# Patient Record
Sex: MALE | Race: BLACK OR AFRICAN AMERICAN | NOT HISPANIC OR LATINO | Employment: OTHER | URBAN - METROPOLITAN AREA
[De-identification: names, ages, dates, MRNs, and addresses within clinical notes are randomized per-mention and may not be internally consistent; named-entity substitution may affect disease eponyms.]

---

## 2017-09-14 DIAGNOSIS — M25.569 PAIN IN JOINT, LOWER LEG: ICD-10-CM

## 2017-09-14 DIAGNOSIS — M54.9 DORSALGIA: Primary | ICD-10-CM

## 2017-10-13 ENCOUNTER — CLINICAL SUPPORT (OUTPATIENT)
Dept: REHABILITATION | Facility: HOSPITAL | Age: 32
End: 2017-10-13
Payer: MEDICAID

## 2017-10-13 DIAGNOSIS — M54.32 BILATERAL SCIATICA: ICD-10-CM

## 2017-10-13 DIAGNOSIS — M54.31 BILATERAL SCIATICA: ICD-10-CM

## 2017-10-13 DIAGNOSIS — R29.3 POSTURE IMBALANCE: Primary | ICD-10-CM

## 2017-10-13 DIAGNOSIS — R53.1 WEAKNESS: ICD-10-CM

## 2017-10-13 PROCEDURE — G8978 MOBILITY CURRENT STATUS: HCPCS | Mod: CJ

## 2017-10-13 PROCEDURE — 97110 THERAPEUTIC EXERCISES: CPT

## 2017-10-13 PROCEDURE — G8979 MOBILITY GOAL STATUS: HCPCS | Mod: CJ

## 2017-10-13 PROCEDURE — 97161 PT EVAL LOW COMPLEX 20 MIN: CPT
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
## 2017-11-06 ENCOUNTER — CLINICAL SUPPORT (OUTPATIENT)
Dept: REHABILITATION | Facility: HOSPITAL | Age: 32
End: 2017-11-06
Payer: MEDICAID

## 2017-11-06 DIAGNOSIS — R53.1 WEAKNESS: ICD-10-CM

## 2017-11-06 DIAGNOSIS — R29.3 POSTURE IMBALANCE: ICD-10-CM

## 2017-11-06 DIAGNOSIS — M54.32 BILATERAL SCIATICA: ICD-10-CM

## 2017-11-06 DIAGNOSIS — M54.31 BILATERAL SCIATICA: ICD-10-CM

## 2017-11-06 PROCEDURE — 97140 MANUAL THERAPY 1/> REGIONS: CPT

## 2017-11-06 PROCEDURE — 97110 THERAPEUTIC EXERCISES: CPT

## 2017-11-06 NOTE — PROGRESS NOTES
"                                                    Physical Therapy Progress Note     Name: Jacques Arizmendimbdelia  Clinic Number: [id number]  Diagnosis:   Encounter Diagnoses   Name Primary?    Weakness     Posture imbalance     Bilateral sciatica      Physician: Darlin Navarro MD  Treatment Orders: Therapeutic exercise  No past medical history on file.    Precautions: standard    Evaluation Date: 10/13/17  Visit # authorized: 2/20  Authorization period: 12/31/17  Plan of care expiration: 12/12/17  Referring Provider: Darlin Navarro MD    Subjective     Pt reports: he has been doing stretching regimen, insanity workout, intermittent N&T 1 to 2 times per week (sometimes with prolonged sitting)    Pain Scale: before treatment: 4 currently; after treatment: n/a    Objective     ROM: before treatment: ; after treatment: NT    Patient received individual therapy to increase strength, endurance, ROM, flexibility, posture and core stabilization with 1 patients with activities as follows:     Therapeutic exercise: Jacques received therapeutic exercises to develop strength, endurance, ROM, flexibility, posture and core stabilization for 15 minutes including:     Prone:  · prayer stretch: 3x30 sec.  Supine:   · DKTC: 3x30 sec.  · bilateral piriformis stretch: 3x30 sec.  · bilateral sciatic nerve glide: 3x10   · MET right ilial anterior/left posterior ilial rotation: 3x5"  Sidelying:     Sitting:  Standing:    Manual therapy: Jacques received the following manual therapy techniques: Joint mobilizations and Soft tissue Mobilization were applied to: right hip/lumbar spine for 25 minutes.    Manual techniques include:  Soft tissue mobilization:  · Vacuum/cupping: STM was performed to cervical through lumbar paraspinals/QL to decrease muscle tightness, increase circulation and promote healing process for 15 min. The pt's skin was monitored for redness adjusting pressure as needed. The pt was instructed in possible side effects of " bruising and/or soreness.   ·   Joint mobilization:  · p/a Tspine  · right hip short axis lateral distraction    Written Home Exercises Provided: updated HEP: [id number]: DKTC, sciatic nerve glide, and prayer stretch  Pt demo fair understanding of the education provided. Jacques demonstrated fair return demonstration of activities.     Pt. education:  · Posture reeducation, body mechanics, HEP,activity modification/avoidance  · No spiritual or educational barriers to learning provided  · Pt has no cultural, educational or language barriers to learning provided.    Assessment     Pt. reported feeling no pian after treatment session with good end range motion. Pt. reports only performing stretching regimen 2 to 3 times per week. Pt. advised to perform stretching daily along with pelvis correction. Pt. verbalized understanding. Pt. thought he would be receiving blood letting; however, that is not a service we provide. As a result, pt. may not comply with additional attendance. Pt. informed he may undergo dry needling with another therapist who is certified in such treatment. Pt will continue to benefit from skilled outpatient physical therapy to address the remaining functional deficits, provide pt/family education, and to maximize pt's level of independence in the home and community environment.    Anticipated barriers to physical therapy: chronicity of condition    · Pt's spiritual, cultural and educational needs considered and pt agreeable to plan of care and goals as stated below:   Medical necessity is demonstrated by the following IMPAIRMENTS/PROBLEM LIST:              1) Pain limiting function              2) Posture dysfunction              3) Core/Lumbar/LE weakness              4) Decreased thoracic/lumbar joint mobility              5) Decreased Lumbar ROM              6) Decreased soft tissue extensibility/fascia restriction              7) Decreased LE flexibility: bilateral hamstrings and piriformis               8) Lack of HEP              9) LE paresthesia bilateral sciatic nerves     GOALS:   Short Term Goals:  4 weeks  1. Report decreased lumbar spine pain </= 5/10 at worst to increase tolerance for prolonged sitting/standing  2. Pt. to demonstrate proper cervical and scapula retraction requiring min. to no verbal cues from PT  3. Pt. to be independent with pelvis self correction and symptom management  4. Pt to tolerate HEP to improve ROM and independence with ADL's  5. Pt. to report a decrease in bilateral LE paresthesia by >/= 50% to improve ability to perform prolonged sitting/standing     Long Term Goals: 8 weeks  1. Report decreased lumbar spine pain </=  2/10 at worst to increase tolerance for prolonged sitting/standing  2. Pt. to demonstrate proper cervical and scapula retraction requiring no verbal cues from PT  3. Increase thoracic joint mobility to 3-/6 to promote greater ease with self care skills  4. Increase lumbar joint mobility to 3-/6 to promote greater ease with prolonged sitting/standing  5. Pt. to demonstrate increased MMT for core/lumbar paraspinals to 3+/5 to increase endurance with prolonged sitting.   6. Pt. to demonstrate increased MMT for bilateral gluteus medius/gluteus wendy to 5-/5  to increase stability during ambulation on uneven surfaces/stair negotiation.  7. Pt. to demonstrate increased MMT for bilateral hip flexor to 5/5 to increase tolerance for ADL and work activities.   8. Pt. to demonstrate increased MMT for bilateral quadriceps to 5/5 to increase ability to squat and perform transitional activities without increased pain.  9. Pt to be independent with HEP to improve ROM and independence with ADL's  10. Pt. to report a decrease in bilateral LE paresthesia by >/= 75% to improve ability to perform high level activities without increased pain.     Plan   Continue with established Plan of Care towards PT goals.

## 2018-01-04 DIAGNOSIS — Z13.79 GENETIC TESTING: Primary | ICD-10-CM

## 2018-01-05 ENCOUNTER — LAB VISIT (OUTPATIENT)
Dept: LAB | Facility: HOSPITAL | Age: 33
End: 2018-01-05
Attending: PSYCHIATRY & NEUROLOGY
Payer: MEDICAID

## 2018-01-05 DIAGNOSIS — Z13.79 GENETIC TESTING: ICD-10-CM

## 2018-01-05 PROCEDURE — 36415 COLL VENOUS BLD VENIPUNCTURE: CPT | Mod: PO

## 2018-02-20 ENCOUNTER — DOCUMENTATION ONLY (OUTPATIENT)
Dept: REHABILITATION | Facility: HOSPITAL | Age: 33
End: 2018-02-20

## 2018-02-20 DIAGNOSIS — R29.3 POSTURE IMBALANCE: ICD-10-CM

## 2018-02-20 DIAGNOSIS — M54.32 BILATERAL SCIATICA: ICD-10-CM

## 2018-02-20 DIAGNOSIS — R53.1 WEAKNESS: ICD-10-CM

## 2018-02-20 DIAGNOSIS — M54.31 BILATERAL SCIATICA: ICD-10-CM

## 2018-02-20 NOTE — PROGRESS NOTES
PHYSICAL THERAPY DISCHARGE SUMMARY     Name: Jacques Conrad  Clinic Number: [id number]    Diagnosis:   Encounter Diagnoses   Name Primary?    Weakness     Posture imbalance     Bilateral sciatica      Physician: Darlin Navarro  Treatment Orders: Therapeutic exercise  No past medical history on file.    Initial visit: 10/13/17  Date of Last visit: 11/6/17  Date of Discharge Note:  2/20/18  Total Visits Received: 2  Missed Visits: 0  ASSESSMENT   Status Towards Goals Met:  Not met due to non-compliance    Goals Not achieved and why: see above    Discharge reason : Pt has not re-scheduled further follow-up sessions    PLAN   This patient is discharged from Physical Therapy Services.     Medical necessity is demonstrated by the following IMPAIRMENTS/PROBLEM LIST:              1) Pain limiting function              2) Posture dysfunction              3) Core/Lumbar/LE weakness              4) Decreased thoracic/lumbar joint mobility              5) Decreased Lumbar ROM              6) Decreased soft tissue extensibility/fascia restriction              7) Decreased LE flexibility: bilateral hamstrings and piriformis              8) Lack of HEP              9) LE paresthesia bilateral sciatic nerves     GOALS:   Short Term Goals:  4 weeks  1. Report decreased lumbar spine pain </= 5/10 at worst to increase tolerance for prolonged sitting/standing  2. Pt. to demonstrate proper cervical and scapula retraction requiring min. to no verbal cues from PT  3. Pt. to be independent with pelvis self correction and symptom management  4. Pt to tolerate HEP to improve ROM and independence with ADL's  5. Pt. to report a decrease in bilateral LE paresthesia by >/= 50% to improve ability to perform prolonged sitting/standing     Long Term Goals: 8 weeks  1. Report decreased lumbar spine pain </=  2/10 at worst to increase tolerance for prolonged sitting/standing  2. Pt. to demonstrate proper cervical and scapula retraction requiring  no verbal cues from PT  3. Increase thoracic joint mobility to 3-/6 to promote greater ease with self care skills  4. Increase lumbar joint mobility to 3-/6 to promote greater ease with prolonged sitting/standing  5. Pt. to demonstrate increased MMT for core/lumbar paraspinals to 3+/5 to increase endurance with prolonged sitting.   6. Pt. to demonstrate increased MMT for bilateral gluteus medius/gluteus wendy to 5-/5  to increase stability during ambulation on uneven surfaces/stair negotiation.  7. Pt. to demonstrate increased MMT for bilateral hip flexor to 5/5 to increase tolerance for ADL and work activities.   8. Pt. to demonstrate increased MMT for bilateral quadriceps to 5/5 to increase ability to squat and perform transitional activities without increased pain.  9. Pt to be independent with HEP to improve ROM and independence with ADL's  10. Pt. to report a decrease in bilateral LE paresthesia by >/= 75% to improve ability to perform high level activities without increased pain.

## 2018-04-19 LAB
MISCELLANEOUS TEST NAME: NORMAL
REFERENCE LAB: NORMAL
SPECIMEN TYPE: NORMAL
TEST RESULT: NORMAL

## 2018-05-24 ENCOUNTER — OFFICE VISIT (OUTPATIENT)
Dept: INFECTIOUS DISEASES | Facility: CLINIC | Age: 33
End: 2018-05-24
Payer: MEDICAID

## 2018-05-24 ENCOUNTER — CLINICAL SUPPORT (OUTPATIENT)
Dept: INFECTIOUS DISEASES | Facility: CLINIC | Age: 33
End: 2018-05-24
Payer: MEDICAID

## 2018-05-24 DIAGNOSIS — Z71.84 TRAVEL ADVICE ENCOUNTER: Primary | ICD-10-CM

## 2018-05-24 DIAGNOSIS — Z23 IMMUNIZATION DUE: ICD-10-CM

## 2018-05-24 DIAGNOSIS — Z71.84 TRAVEL ADVICE ENCOUNTER: ICD-10-CM

## 2018-05-24 PROCEDURE — 90691 TYPHOID VACCINE IM: CPT | Mod: PBBFAC

## 2018-05-24 PROCEDURE — 90734 MENACWYD/MENACWYCRM VACC IM: CPT | Mod: PBBFAC

## 2018-05-24 PROCEDURE — 90471 IMMUNIZATION ADMIN: CPT | Mod: PBBFAC

## 2018-05-24 PROCEDURE — 99402 PREV MED CNSL INDIV APPRX 30: CPT | Mod: S$PBB,,, | Performed by: INTERNAL MEDICINE

## 2018-05-24 PROCEDURE — 90472 IMMUNIZATION ADMIN EACH ADD: CPT | Mod: PBBFAC

## 2018-05-24 RX ORDER — AZITHROMYCIN 500 MG/1
TABLET, FILM COATED ORAL
Qty: 2 TABLET | Refills: 0 | Status: SHIPPED | OUTPATIENT
Start: 2018-05-24 | End: 2019-07-19

## 2018-05-24 RX ORDER — ATOVAQUONE AND PROGUANIL HYDROCHLORIDE 250; 100 MG/1; MG/1
TABLET, FILM COATED ORAL
Qty: 24 TABLET | Refills: 0 | Status: SHIPPED | OUTPATIENT
Start: 2018-05-24

## 2018-05-24 NOTE — PROGRESS NOTES
Pt received his travel immunizations (Hep A, Typhoid, Menactra Meningococcal, and Tdap). Pt tolerated the injections well. Yellow travel card provided. Pt left the unit in NAD.

## 2018-05-26 NOTE — PROGRESS NOTES
Subjective:      Chief Complaint: No chief complaint on file.      History of Present Illness    Patient  32 y.o. male who presents today for routine pretravel consultation.  The patient reports no past medical history.  The patient reports the following medication allergies; none.  The patient reports the following food allergies; pork .  The patient will be traveling to [facility] on June 4.  The patient will be at this destination for 14 days.  The patient will be lodging at hotels.  The patient has travelled to the following other countries in the past; [place], [place].  The patient reports that they received all their childhood vaccinations.  The patient reports receipt of the following travel related vaccinations; none.  The purpose of this trip is vacation.      Review of Systems   All other systems reviewed and are negative.    Objective:   Physical Exam   Assessment:     Pre-Travel clinic assessment    Plan:   Patient specific risks:      Patient does not have any medical problems that would restrict travel.    Destination specific risks:      -Infectious Disease risks:       Mosquito Borne pathogens:  Reviewed basic mosquito avoidance precautions including wearing long sleeve clothing and insect repellant.  He declined the yellow fever vaccine.  Will prescribe malarone for malaria prophylaxis.     Food Borne pathogens:  Reviewed basic hand, food and water sanitation precautions.  Patient instructed to take hand  on their trip.  Will give typhoid IM and hepatitis A IM vaccine.     Routine:  Will give Tdap and menactra today.    -Environmental risks:     Precautions to minimize risk/exposure to crime and motor vehicle accidents were reviewed with the patient.

## 2019-07-19 ENCOUNTER — OFFICE VISIT (OUTPATIENT)
Dept: URGENT CARE | Facility: CLINIC | Age: 34
End: 2019-07-19
Payer: MEDICAID

## 2019-07-19 VITALS
BODY MASS INDEX: 32.23 KG/M2 | SYSTOLIC BLOOD PRESSURE: 126 MMHG | HEIGHT: 72 IN | OXYGEN SATURATION: 99 % | RESPIRATION RATE: 18 BRPM | TEMPERATURE: 98 F | HEART RATE: 54 BPM | DIASTOLIC BLOOD PRESSURE: 78 MMHG | WEIGHT: 238 LBS

## 2019-07-19 DIAGNOSIS — H00.014 HORDEOLUM EXTERNUM OF LEFT UPPER EYELID: Primary | ICD-10-CM

## 2019-07-19 PROCEDURE — 99214 PR OFFICE/OUTPT VISIT, EST, LEVL IV, 30-39 MIN: ICD-10-PCS | Mod: S$GLB,,, | Performed by: EMERGENCY MEDICINE

## 2019-07-19 PROCEDURE — 99214 OFFICE O/P EST MOD 30 MIN: CPT | Mod: S$GLB,,, | Performed by: EMERGENCY MEDICINE

## 2019-07-19 RX ORDER — ERYTHROMYCIN 5 MG/G
OINTMENT OPHTHALMIC 3 TIMES DAILY
Qty: 1 TUBE | Refills: 1 | Status: SHIPPED | OUTPATIENT
Start: 2019-07-19 | End: 2019-07-29

## 2019-07-19 RX ORDER — FLUTICASONE PROPIONATE 50 MCG
1 SPRAY, SUSPENSION (ML) NASAL
COMMUNITY
Start: 2019-05-29 | End: 2019-07-26

## 2019-07-20 NOTE — PROGRESS NOTES
Subjective:       Patient ID: Jacques Conrad is a 34 y.o. male.    Vitals:  height is 6' (1.829 m) and weight is 108 kg (238 lb). His temperature is 98.3 °F (36.8 °C). His blood pressure is 126/78 and his pulse is 54 (abnormal). His respiration is 18 and oxygen saturation is 99%.     Chief Complaint: Eye Problem (left eye)    3-5 d hx non tender non draining left upper eye lid swelling, NOC.    Eye Problem    The left eye is affected. This is a new problem. Episode onset: 5 days. The problem occurs constantly. The problem has been unchanged. The pain is at a severity of 0/10. The pain is mild. There is no known exposure to pink eye. He does not wear contacts. Associated symptoms include eye redness. Pertinent negatives include no blurred vision, double vision, fever, nausea or vomiting.       Constitution: Negative for chills, fatigue and fever.   HENT: Negative for congestion and sore throat.    Neck: Negative for painful lymph nodes.   Cardiovascular: Negative for chest pain and leg swelling.   Eyes: Positive for eye pain and eye redness. Negative for double vision and blurred vision.   Respiratory: Negative for cough and shortness of breath.    Gastrointestinal: Negative for nausea, vomiting and diarrhea.   Genitourinary: Negative for dysuria, frequency and urgency.   Musculoskeletal: Negative for joint pain, joint swelling, muscle cramps and muscle ache.   Skin: Negative for color change, pale and rash.   Allergic/Immunologic: Negative for seasonal allergies.   Neurological: Negative for dizziness, history of vertigo, light-headedness, passing out and headaches.   Hematologic/Lymphatic: Negative for swollen lymph nodes, easy bruising/bleeding and history of blood clots. Does not bruise/bleed easily.   Psychiatric/Behavioral: Negative for nervous/anxious, sleep disturbance and depression. The patient is not nervous/anxious.        Objective:      Physical Exam   Constitutional: He is oriented to person, place,  and time. He appears well-developed and well-nourished.   HENT:   Head: Normocephalic and atraumatic.   Nose: Nose normal.   Mouth/Throat: Oropharynx is clear and moist.   Eyes: Pupils are equal, round, and reactive to light. EOM are normal.       Left upper eye lid margin swelling lateral of mid line, remainder left eye clear, no drainage, no periorbital edema/swelling, sclera clear   Neck: Normal range of motion. Neck supple.   Cardiovascular: Normal rate, regular rhythm and normal heart sounds.   Pulmonary/Chest: Breath sounds normal.   Musculoskeletal: Normal range of motion.   Neurological: He is alert and oriented to person, place, and time.   Skin: Skin is warm and dry.   Psychiatric: He has a normal mood and affect. His behavior is normal.       Assessment:       1. Hordeolum externum of left upper eyelid        Plan:         Hordeolum externum of left upper eyelid        Isra Myers MD  Go to the Emergency Department for any problems  Call your PCP for follow up next available.

## 2019-07-20 NOTE — PATIENT INSTRUCTIONS
Isra Myers MD  Go to the Emergency Department for any problems  Call your PCP for follow up next available.    Sty (or Stye)  A sty is an infection of the oil gland of the eyelid. It may develop into a small pocket of pus (abscess). This can cause pain, redness, and swelling. In early stages, styes are treated with antibiotic cream, eye drops, or warm packs (small towels soaked in warm water). More severe cases may need to be opened and drained by a health care provider.  Home care  · Eye drops or ointment are usually prescribed to treat the infection. Use these as directed.   ¨ Artificial tears may also be used to lubricate the eye and make it more comfortable. These may be purchased without a prescription.   ¨ Talk to your health care provider before using any over-the-counter treatment for a sty.  · Apply a warm, damp towel to the affected eye for at least 5 minutes, 3 to 4 times a day for a week. Warm compresses open the pores and speed the healing. If the compresses are too hot, they may burn your eyelid.  · Sometimes the sty will drain with this treatment alone. If this happens, continue the antibiotic until all the redness and swelling are gone.  · Wash your hands before and after touching the infected eye to avoid spreading the infection.  · Do not squeeze or try to puncture the sty.  Follow-up care  Follow up with your health care provider, or as advised.   When to seek medical advice  Call your health care provider right away if you have:  · Increase in swelling or redness around the eyelid after 48 to 72 hours  · Increase in eye pain or the eyelid blisters  · Increase in warmth--the eyelid feels hot  · Drainage of blood or thick pus from the sty  · Blister on the eyelid  · Inability to open the eyelid due to swelling  · Fever  ¨ 1 degree above your normal temperature lasting for 24 to 48 hours, or  ¨ Whatever your health care provider told you to report based on your medical condition  · Vision  changes  · Headache or stiff neck  · Recurrence of the sty  Date Last Reviewed: 6/14/2015  © 3893-4410 [employer] [address] [place] [address], [place] [address]. All rights reserved. This information is not intended as a substitute for professional medical care. Always follow your healthcare professional's instructions.

## 2019-07-22 ENCOUNTER — TELEPHONE (OUTPATIENT)
Dept: URGENT CARE | Facility: CLINIC | Age: 34
End: 2019-07-22

## 2021-07-28 ENCOUNTER — TELEPHONE (OUTPATIENT)
Dept: ADMINISTRATIVE | Facility: OTHER | Age: 36
End: 2021-07-28

## 2021-07-28 ENCOUNTER — HOSPITAL ENCOUNTER (EMERGENCY)
Facility: HOSPITAL | Age: 36
Discharge: HOME | End: 2021-07-28
Attending: EMERGENCY MEDICINE
Payer: MEDICAID

## 2021-07-28 VITALS
OXYGEN SATURATION: 95 % | RESPIRATION RATE: 16 BRPM | SYSTOLIC BLOOD PRESSURE: 131 MMHG | HEART RATE: 77 BPM | DIASTOLIC BLOOD PRESSURE: 70 MMHG | TEMPERATURE: 102 F

## 2021-07-28 DIAGNOSIS — U07.1 COVID-19: Primary | ICD-10-CM

## 2021-07-28 LAB
CTP QC/QA: YES
SARS-COV-2 RDRP RESP QL NAA+PROBE: POSITIVE

## 2021-07-28 PROCEDURE — 99284 PR EMERGENCY DEPT VISIT,LEVEL IV: ICD-10-PCS | Mod: CR,CS,, | Performed by: PHYSICIAN ASSISTANT

## 2021-07-28 PROCEDURE — U0002 COVID-19 LAB TEST NON-CDC: HCPCS | Performed by: EMERGENCY MEDICINE

## 2021-07-28 PROCEDURE — 99284 EMERGENCY DEPT VISIT MOD MDM: CPT | Mod: CR,CS,, | Performed by: PHYSICIAN ASSISTANT

## 2021-07-28 PROCEDURE — 99283 EMERGENCY DEPT VISIT LOW MDM: CPT

## 2021-07-28 RX ORDER — ONDANSETRON 4 MG/1
4 TABLET, ORALLY DISINTEGRATING ORAL EVERY 8 HOURS PRN
Qty: 12 TABLET | Refills: 0 | Status: SHIPPED | OUTPATIENT
Start: 2021-07-28